# Patient Record
Sex: MALE | Race: WHITE | URBAN - METROPOLITAN AREA
[De-identification: names, ages, dates, MRNs, and addresses within clinical notes are randomized per-mention and may not be internally consistent; named-entity substitution may affect disease eponyms.]

---

## 2017-08-28 ENCOUNTER — HOSPITAL ENCOUNTER (EMERGENCY)
Facility: CLINIC | Age: 37
Discharge: HOME | End: 2017-08-28
Attending: EMERGENCY MEDICINE | Admitting: EMERGENCY MEDICINE

## 2017-08-28 VITALS
HEIGHT: 73 IN | WEIGHT: 184 LBS | TEMPERATURE: 98.3 F | BODY MASS INDEX: 24.39 KG/M2 | DIASTOLIC BLOOD PRESSURE: 73 MMHG | OXYGEN SATURATION: 99 % | SYSTOLIC BLOOD PRESSURE: 143 MMHG

## 2017-08-28 DIAGNOSIS — S01.01XA SCALP LACERATION, INITIAL ENCOUNTER: ICD-10-CM

## 2017-08-28 PROCEDURE — 12032 INTMD RPR S/A/T/EXT 2.6-7.5: CPT

## 2017-08-28 PROCEDURE — 90715 TDAP VACCINE 7 YRS/> IM: CPT | Performed by: EMERGENCY MEDICINE

## 2017-08-28 PROCEDURE — 25000128 H RX IP 250 OP 636: Performed by: EMERGENCY MEDICINE

## 2017-08-28 PROCEDURE — 90471 IMMUNIZATION ADMIN: CPT

## 2017-08-28 PROCEDURE — 99283 EMERGENCY DEPT VISIT LOW MDM: CPT

## 2017-08-28 RX ADMIN — CLOSTRIDIUM TETANI TOXOID ANTIGEN (FORMALDEHYDE INACTIVATED), CORYNEBACTERIUM DIPHTHERIAE TOXOID ANTIGEN (FORMALDEHYDE INACTIVATED), BORDETELLA PERTUSSIS TOXOID ANTIGEN (GLUTARALDEHYDE INACTIVATED), BORDETELLA PERTUSSIS FILAMENTOUS HEMAGGLUTININ ANTIGEN (FORMALDEHYDE INACTIVATED), BORDETELLA PERTUSSIS PERTACTIN ANTIGEN, AND BORDETELLA PERTUSSIS FIMBRIAE 2/3 ANTIGEN 0.5 ML: 5; 2; 2.5; 5; 3; 5 INJECTION, SUSPENSION INTRAMUSCULAR at 22:12

## 2017-08-28 ASSESSMENT — ENCOUNTER SYMPTOMS
VOMITING: 0
WOUND: 1

## 2017-08-28 NOTE — ED AVS SNAPSHOT
Emergency Department    [address] [place] [address]    Phone:  [phone]    Fax:  [phone]                                       Kareem Jackson   MRN: [id number]    Department:   Emergency Department   Date of Visit:  8/28/2017           After Visit Summary Signature Page     I have received my discharge instructions, and my questions have been answered. I have discussed any challenges I see with this plan with the nurse or doctor.    ..........................................................................................................................................  Patient/Patient Representative Signature      ..........................................................................................................................................  Patient Representative Print Name and Relationship to Patient    ..................................................               ................................................  Date                                            Time    ..........................................................................................................................................  Reviewed by Signature/Title    ...................................................              ..............................................  Date                                                            Time

## 2017-08-28 NOTE — ED AVS SNAPSHOT
Emergency Department    [address] [facility] [address]    Phone:  [phone]    Fax:  [phone]                                       Kareem Jackson   MRN: [id number]    Department:   Emergency Department   Date of Visit:  8/28/2017           Patient Information     Date Of Birth          1980        Your diagnoses for this visit were:     Scalp laceration, initial encounter        You were seen by Carly Cast MD.      Follow-up Information     Schedule an appointment as soon as possible for a visit with Sergey Mendoza MD.    Specialty:  Family Practice    Contact information:    [address] [facility]  [address]  [facility] [address] [phone]          Follow up with  Emergency Department.    Specialty:  EMERGENCY MEDICINE    Why:  If symptoms worsen    Contact information:    [address] [facility] [id number] [phone]        Discharge Instructions       Follow up in clinic for staple removal in 7-10 days  Keep dressing on for first 24 hours then can change daily after this  Watch for signs of infection. Use tylenol and ibuprofen for pain  Concerning new symptoms would be confusion, severe headache, vomiting and should be reseen  Likely will be more sore tomorrow        Discharge Instructions  Laceration (Cut)    You were seen today for a laceration (cut).  Your doctor examined your laceration for any problems such a buried foreign body (like glass, a splinter, or gravel), or injury to blood vessels, tendons, and nerves.  Your doctor may have also rinsed and/or scrubbed your laceration to help prevent an infection.  Your laceration may have been closed with glue, staples or sutures (stitches).      It may not be possible to find all problems with your laceration on the first visit, and we can't always prevent infections.  Antibiotics are only given when the benefit is more than the risk, and don't prevent all infections. Some lacerations are  too high risk to close, and are left open to heal.  All lacerations, no matter how expertly repaired, will cause scarring.    Return to the Emergency Department right away if:    You have more redness, swelling, pain, drainage (pus), a bad smell, or red streaking from your laceration.      You have a fever of 101oF or more.    You have bleeding that you can t stop at home. If your cut starts to bleed, hold pressure on the bleeding area with a clean cloth or put pressure over the bandage.  If the bleeding doesn t stop after using constant pressure for 30 minutes, you should return to the Emergency Department for further treatment.    An area past the laceration is cool, pale, or blue compared with the other side, or has a slower return of color when squeezed.    Your dressing seems too tight or starts to get uncomfortable or painful.    You have loss of normal function or use of an area, such as being unable to straighten or bend a finger normally.    You have a numb area past the laceration.    Return to the Emergency Department or see your regular doctor if:    The laceration starts to come open.     You have something coming out of the cut or a feeling that there is something in the laceration.    Your wound will not heal, or keeps breaking open. There can always be glass, wood, dirt or other things in any wound.  They won t always show up, even on x-rays.  If a wound doesn t heal, this may be why, and it is important to follow-up with your regular doctor.    Home Care:    Take your dressing off in 12 hours, or as instructed by your doctor, to check your laceration. Remove the dressing sooner if it seems too tight or painful, or if it is getting numb, tingly, or pale past the dressing.    Gently wash your laceration 2 times a day with clean cloth and soap.     It is okay to shower, but do not let the laceration soak in water.      If your laceration was closed with wound adhesive or strips: pat it dry and leave it  "open to the air.     For all other repairs: after you wash your laceration, or at least 2 times a day, apply bacitracin or other antibiotic ointment to the laceration, then cover it with a Band-Aid  or gauze.    Keep the laceration clean. Wear gloves or other protective clothing if you are around dirt.    Follow-up:    You need to follow-up with your regular doctor in 7 days.    Your sutures or staples need to be removed in 7 days. Schedule an appointment with your regular doctor to have this done.    Scars:  To help minimize scarring:    Wear sunscreen over the healed laceration when out in the sun.    Massage the area regularly.    You may use Vitamin E oil.    Wait a year.  Most scars will start to fade within a year.    Probiotics: If you have been given an antibiotic, you may want to also take a probiotic pill or eat yogurt with live cultures. Probiotics have \"good bacteria\" to help your intestines stay healthy. Studies have shown that probiotics help prevent diarrhea and other intestine problems (including C. diff infection) when you take antibiotics. You can buy these without a prescription in the pharmacy section of the store.     If you were given a prescription for medicine here today, be sure to read all of the information (including the package insert) that comes with your prescription.  This will include important information about the medicine, its side effects, and any warnings that you need to know about.  The pharmacist who fills the prescription can provide more information and answer questions you may have about the medicine.  If you have questions or concerns that the pharmacist cannot address, please call or return to the Emergency Department.       Remember that you can always come back to the Emergency Department if you are not able to see your regular doctor in the amount of time listed above, if you get any new symptoms, or if there is anything that worries you.          24 Hour Appointment " Hotline       To make an appointment at any [facility], call [phone] ([phone]). If you don't have a family doctor or clinic, we will help you find one. [facility] are conveniently located to serve the needs of you and your family.             Review of your medicines      Our records show that you are taking the medicines listed below. If these are incorrect, please call your family doctor or clinic.        Dose / Directions Last dose taken    EFFEXOR PO   Dose:  150 mg        Take 150 mg by mouth daily   Refills:  0                Orders Needing Specimen Collection     None      Pending Results     No orders found from 8/26/2017 to 8/29/2017.            Pending Culture Results     No orders found from 8/26/2017 to 8/29/2017.            Pending Results Instructions     If you had any lab results that were not finalized at the time of your Discharge, you can call the ED Lab Result RN at [phone]. You will be contacted by this team for any positive Lab results or changes in treatment. The nurses are available 7 days a week from 10A to 6:30P.  You can leave a message 24 hours per day and they will return your call.        Test Results From Your Hospital Stay               Clinical Quality Measure: Blood Pressure Screening     Your blood pressure was checked while you were in the emergency department today. The last reading we obtained was  BP: 143/73 . Please read the guidelines below about what these numbers mean and what you should do about them.  If your systolic blood pressure (the top number) is less than 120 and your diastolic blood pressure (the bottom number) is less than 80, then your blood pressure is normal. There is nothing more that you need to do about it.  If your systolic blood pressure (the top number) is 120-139 or your diastolic blood pressure (the bottom number) is 80-89, your blood pressure may be higher than it should be. You should have your blood pressure rechecked  "within a year by a primary care provider.  If your systolic blood pressure (the top number) is 140 or greater or your diastolic blood pressure (the bottom number) is 90 or greater, you may have high blood pressure. High blood pressure is treatable, but if left untreated over time it can put you at risk for heart attack, stroke, or kidney failure. You should have your blood pressure rechecked by a primary care provider within the next 4 weeks.  If your provider in the emergency department today gave you specific instructions to follow-up with your doctor or provider even sooner than that, you should follow that instruction and not wait for up to 4 weeks for your follow-up visit.        Thank you for choosing [facility]       Thank you for choosing [facility] for your care. Our goal is always to provide you with excellent care. Hearing back from our patients is one way we can continue to improve our services. Please take a few minutes to complete the written survey that you may receive in the mail after you visit with us. Thank you!        [facility] Information     [employer] lets you send messages to your doctor, view your test results, renew your prescriptions, schedule appointments and more. To sign up, go to [email or web address] . Click on \"Log in\" on the left side of the screen, which will take you to the Welcome page. Then click on \"Sign up Now\" on the right side of the page.     You will be asked to enter the access code listed below, as well as some personal information. Please follow the directions to create your username and password.     Your access code is: [email or web address]  Expires: 2017 10:43 PM     Your access code will  in 90 days. If you need help or a new code, please call your [facility] clinic or [phone].        Care EveryWhere ID     This is your Care EveryWhere ID. This could be used by other organizations to access your [facility] medical records  [id number]        Equal Access to Services  "    KAREN VILLATORO : Hadii tona Carrillo, wasilverda luqadaha, qaybta kaalmajesi hartman, rudy quintero. So [facility] [phone].    ATENCIÓN: Si habla español, tiene a vargas disposición servicios gratuitos de asistencia lingüística. Llame al [phone].    We comply with applicable federal civil rights laws and [place] laws. We do not discriminate on the basis of race, color, national origin, age, disability sex, sexual orientation or gender identity.            After Visit Summary       This is your record. Keep this with you and show to your community pharmacist(s) and doctor(s) at your next visit.

## 2017-08-29 NOTE — ED PROVIDER NOTES
"  History     Chief Complaint:  Head Injury    HPI   Kareem Jackson is a 37 year old male who presents with a head injury. The patient was taking apart a bed earlier tonight, when a portion of the bed struck the top of his head, and he suffered a laceration. No LOC. He has been acting normally and not confused. No vomiting. Patient is not on any anticoagulation. Last tetanus was 2011.     Allergies:  The patient has no known drug allergies.     Medications:    Effexor     Past Medical History:    Depression  Schizoaffective disorder    Past Surgical History:    ENT surgery  Orthopedic surgery    Family History:    History reviewed.  No significant family history.    Social History:  Relationship status: Single  Tobacco use: Former smoker  Alcohol use: Negative  The patient presents with his wife.     Review of Systems   Gastrointestinal: Negative for vomiting.   Skin: Positive for wound.   Neurological: Negative for syncope.   All other systems reviewed and are negative.      Physical Exam   First Vitals:  BP: 143/73  Heart Rate: 94  Temp: 98.3  F (36.8  C)  Height: 185.4 cm (6' 1\")  Weight: 83.5 kg (184 lb)  SpO2: 99 %    Physical Exam  General: Resting comfortably on the gurney  Eyes:  The pupils are equal and round    Conjunctivae and sclerae are normal  HENT:   5 cm laceration to the top of scalp    No palpable skull fracture     Galea appears intact  Neck:  Normal range of motion    No midline neck tenderness    Mild left sided paracervical spine tenderness  CV:  Regular rate and rhythm    Skin warm and well perfused   Resp:  Lungs are clear    Non-labored    No rales    No wheezing   MS:  Normal muscular tone  Skin:  No rash or acute skin lesions noted  Neuro:   Awake, alert.     GCS 15     Speech is normal and fluent.    Face is symmetric.     Moves all extremities  Psych:  Normal affect.  Appropriate interactions.      Emergency Department Course     Procedures:     Laceration Repair      LACERATION:  A clean " 5 cm laceration.    LOCATION:  Head.    FUNCTION:  Distally sensation and circulation are intact.    ANESTHESIA:  Local using lido w/epi total of 6 mLs.    PREPARATION:  Irrigation and Scrubbing with Normal Saline.    DEBRIDEMENT:  no debridement.    CLOSURE:  Wound was closed with Two Layers: Subcutaneous layer closed with 3 x 4.0 Vicryl Sutures. Skin closed with 8 Staples.    Interventions:  2212: Tdap, 0.5 mL, IM    Emergency Department Course:  Nursing notes and vitals reviewed.  I performed an exam of the patient as documented above.  The above workup was undertaken.  2116: I performed the laceration repair described above.    Findings and plan explained to the Patient. Patient discharged home, status improved, with instructions regarding supportive care, medications, and reasons to return as well as the importance of close follow-up was reviewed.      Impression & Plan      Medical Decision Making:  The patient presented with a laceration.  The wound was carefully evaluated and explored.  No foreign bodies were noted on exploration or irrigation.  The laceration was closed with sutures/staples as noted above. Absorbable deep sutures placed as well as staples.  There is no evidence of muscular, tendon, or bony damage with this laceration.  Does not meet criteria for imaging of head/neck based on [place] cervical/head CT rules. Possible complications (infection, scarring) were reviewed with the patient.  Follow up with primary care will be indicated for staple removal as noted in the discharge section.    Diagnosis:    ICD-10-CM   1. Scalp laceration, initial encounter S01.01XA     Disposition:  Discharge to home with primary care follow up.     Rico ELAINE, am serving as a scribe on 8/28/2017 at 8:59 PM to personally document services performed by Carly Cast MD, based on my observations and the provider's statements to me.      EMERGENCY DEPARTMENT       Carly Cast MD  08/29/17  0147

## 2017-08-29 NOTE — DISCHARGE INSTRUCTIONS
Follow up in clinic for staple removal in 7-10 days  Keep dressing on for first 24 hours then can change daily after this  Watch for signs of infection. Use tylenol and ibuprofen for pain  Concerning new symptoms would be confusion, severe headache, vomiting and should be reseen  Likely will be more sore tomorrow        Discharge Instructions  Laceration (Cut)    You were seen today for a laceration (cut).  Your doctor examined your laceration for any problems such a buried foreign body (like glass, a splinter, or gravel), or injury to blood vessels, tendons, and nerves.  Your doctor may have also rinsed and/or scrubbed your laceration to help prevent an infection.  Your laceration may have been closed with glue, staples or sutures (stitches).      It may not be possible to find all problems with your laceration on the first visit, and we can't always prevent infections.  Antibiotics are only given when the benefit is more than the risk, and don't prevent all infections. Some lacerations are too high risk to close, and are left open to heal.  All lacerations, no matter how expertly repaired, will cause scarring.    Return to the Emergency Department right away if:    You have more redness, swelling, pain, drainage (pus), a bad smell, or red streaking from your laceration.      You have a fever of 101oF or more.    You have bleeding that you can t stop at home. If your cut starts to bleed, hold pressure on the bleeding area with a clean cloth or put pressure over the bandage.  If the bleeding doesn t stop after using constant pressure for 30 minutes, you should return to the Emergency Department for further treatment.    An area past the laceration is cool, pale, or blue compared with the other side, or has a slower return of color when squeezed.    Your dressing seems too tight or starts to get uncomfortable or painful.    You have loss of normal function or use of an area, such as being unable to straighten or bend  "a finger normally.    You have a numb area past the laceration.    Return to the Emergency Department or see your regular doctor if:    The laceration starts to come open.     You have something coming out of the cut or a feeling that there is something in the laceration.    Your wound will not heal, or keeps breaking open. There can always be glass, wood, dirt or other things in any wound.  They won t always show up, even on x-rays.  If a wound doesn t heal, this may be why, and it is important to follow-up with your regular doctor.    Home Care:    Take your dressing off in 12 hours, or as instructed by your doctor, to check your laceration. Remove the dressing sooner if it seems too tight or painful, or if it is getting numb, tingly, or pale past the dressing.    Gently wash your laceration 2 times a day with clean cloth and soap.     It is okay to shower, but do not let the laceration soak in water.      If your laceration was closed with wound adhesive or strips: pat it dry and leave it open to the air.     For all other repairs: after you wash your laceration, or at least 2 times a day, apply bacitracin or other antibiotic ointment to the laceration, then cover it with a Band-Aid  or gauze.    Keep the laceration clean. Wear gloves or other protective clothing if you are around dirt.    Follow-up:    You need to follow-up with your regular doctor in 7 days.    Your sutures or staples need to be removed in 7 days. Schedule an appointment with your regular doctor to have this done.    Scars:  To help minimize scarring:    Wear sunscreen over the healed laceration when out in the sun.    Massage the area regularly.    You may use Vitamin E oil.    Wait a year.  Most scars will start to fade within a year.    Probiotics: If you have been given an antibiotic, you may want to also take a probiotic pill or eat yogurt with live cultures. Probiotics have \"good bacteria\" to help your intestines stay healthy. Studies " have shown that probiotics help prevent diarrhea and other intestine problems (including C. diff infection) when you take antibiotics. You can buy these without a prescription in the pharmacy section of the store.     If you were given a prescription for medicine here today, be sure to read all of the information (including the package insert) that comes with your prescription.  This will include important information about the medicine, its side effects, and any warnings that you need to know about.  The pharmacist who fills the prescription can provide more information and answer questions you may have about the medicine.  If you have questions or concerns that the pharmacist cannot address, please call or return to the Emergency Department.       Remember that you can always come back to the Emergency Department if you are not able to see your regular doctor in the amount of time listed above, if you get any new symptoms, or if there is anything that worries you.